# Patient Record
Sex: MALE | Race: WHITE | Employment: FULL TIME | URBAN - METROPOLITAN AREA
[De-identification: names, ages, dates, MRNs, and addresses within clinical notes are randomized per-mention and may not be internally consistent; named-entity substitution may affect disease eponyms.]

---

## 2022-12-24 ENCOUNTER — ANESTHESIA EVENT (OUTPATIENT)
Dept: EMERGENCY DEPT | Age: 47
End: 2022-12-24

## 2022-12-24 ENCOUNTER — ANESTHESIA (OUTPATIENT)
Dept: EMERGENCY DEPT | Age: 47
End: 2022-12-24
Payer: COMMERCIAL

## 2022-12-24 ENCOUNTER — HOSPITAL ENCOUNTER (EMERGENCY)
Age: 47
Discharge: HOME | End: 2022-12-24
Attending: EMERGENCY MEDICINE
Payer: COMMERCIAL

## 2022-12-24 ENCOUNTER — ANESTHESIA (OUTPATIENT)
Dept: EMERGENCY DEPT | Age: 47
End: 2022-12-24

## 2022-12-24 ENCOUNTER — ANESTHESIA EVENT (OUTPATIENT)
Dept: EMERGENCY DEPT | Age: 47
End: 2022-12-24
Payer: COMMERCIAL

## 2022-12-24 VITALS
WEIGHT: 215.6 LBS | SYSTOLIC BLOOD PRESSURE: 133 MMHG | OXYGEN SATURATION: 99 % | TEMPERATURE: 96.9 F | DIASTOLIC BLOOD PRESSURE: 80 MMHG | HEART RATE: 55 BPM | RESPIRATION RATE: 25 BRPM | BODY MASS INDEX: 24.94 KG/M2 | HEIGHT: 78 IN

## 2022-12-24 DIAGNOSIS — G97.1 POST LUMBAR PUNCTURE HEADACHE: Primary | ICD-10-CM

## 2022-12-24 LAB
A/G RATIO: 1.5 (ref 1.1–2.2)
ALBUMIN SERPL-MCNC: 4.2 G/DL (ref 3.4–5)
ALP BLD-CCNC: 75 U/L (ref 40–129)
ALT SERPL-CCNC: <5 U/L (ref 10–40)
ANION GAP SERPL CALCULATED.3IONS-SCNC: 5 MMOL/L (ref 3–16)
APTT: 27.4 SEC (ref 23–34.3)
AST SERPL-CCNC: 10 U/L (ref 15–37)
BASOPHILS ABSOLUTE: 0.1 K/UL (ref 0–0.2)
BASOPHILS RELATIVE PERCENT: 1 %
BILIRUB SERPL-MCNC: <0.2 MG/DL (ref 0–1)
BUN BLDV-MCNC: 10 MG/DL (ref 7–20)
CALCIUM SERPL-MCNC: 9.7 MG/DL (ref 8.3–10.6)
CHLORIDE BLD-SCNC: 98 MMOL/L (ref 99–110)
CO2: 33 MMOL/L (ref 21–32)
CREAT SERPL-MCNC: 0.8 MG/DL (ref 0.9–1.3)
EOSINOPHILS ABSOLUTE: 0.2 K/UL (ref 0–0.6)
EOSINOPHILS RELATIVE PERCENT: 3.7 %
GFR SERPL CREATININE-BSD FRML MDRD: >60 ML/MIN/{1.73_M2}
GLUCOSE BLD-MCNC: 107 MG/DL (ref 70–99)
HCT VFR BLD CALC: 42 % (ref 40.5–52.5)
HEMOGLOBIN: 13.9 G/DL (ref 13.5–17.5)
INR BLD: 1.02 (ref 0.87–1.14)
LYMPHOCYTES ABSOLUTE: 1.4 K/UL (ref 1–5.1)
LYMPHOCYTES RELATIVE PERCENT: 25.3 %
MCH RBC QN AUTO: 29.3 PG (ref 26–34)
MCHC RBC AUTO-ENTMCNC: 33.1 G/DL (ref 31–36)
MCV RBC AUTO: 88.5 FL (ref 80–100)
MONOCYTES ABSOLUTE: 0.4 K/UL (ref 0–1.3)
MONOCYTES RELATIVE PERCENT: 7.6 %
NEUTROPHILS ABSOLUTE: 3.3 K/UL (ref 1.7–7.7)
NEUTROPHILS RELATIVE PERCENT: 62.4 %
PDW BLD-RTO: 13.5 % (ref 12.4–15.4)
PLATELET # BLD: 184 K/UL (ref 135–450)
PMV BLD AUTO: 7.3 FL (ref 5–10.5)
POTASSIUM SERPL-SCNC: 4 MMOL/L (ref 3.5–5.1)
PROTHROMBIN TIME: 13.3 SEC (ref 11.7–14.5)
RBC # BLD: 4.75 M/UL (ref 4.2–5.9)
SODIUM BLD-SCNC: 136 MMOL/L (ref 136–145)
TOTAL PROTEIN: 7 G/DL (ref 6.4–8.2)
WBC # BLD: 5.3 K/UL (ref 4–11)

## 2022-12-24 PROCEDURE — 62273 INJECT EPIDURAL PATCH: CPT | Performed by: ANESTHESIOLOGY

## 2022-12-24 PROCEDURE — 85610 PROTHROMBIN TIME: CPT

## 2022-12-24 PROCEDURE — 99283 EMERGENCY DEPT VISIT LOW MDM: CPT

## 2022-12-24 PROCEDURE — 80053 COMPREHEN METABOLIC PANEL: CPT

## 2022-12-24 PROCEDURE — 85730 THROMBOPLASTIN TIME PARTIAL: CPT

## 2022-12-24 PROCEDURE — 85025 COMPLETE CBC W/AUTO DIFF WBC: CPT

## 2022-12-24 ASSESSMENT — PAIN DESCRIPTION - LOCATION: LOCATION: HEAD

## 2022-12-24 ASSESSMENT — PAIN DESCRIPTION - PAIN TYPE
TYPE: ACUTE PAIN
TYPE: ACUTE PAIN

## 2022-12-24 ASSESSMENT — PAIN SCALES - GENERAL
PAINLEVEL_OUTOF10: 8
PAINLEVEL_OUTOF10: 8
PAINLEVEL_OUTOF10: 3

## 2022-12-24 ASSESSMENT — PAIN DESCRIPTION - ONSET
ONSET: AWAKENED FROM SLEEP
ONSET: ON-GOING

## 2022-12-24 ASSESSMENT — PAIN - FUNCTIONAL ASSESSMENT
PAIN_FUNCTIONAL_ASSESSMENT: ACTIVITIES ARE NOT PREVENTED
PAIN_FUNCTIONAL_ASSESSMENT: ACTIVITIES ARE NOT PREVENTED
PAIN_FUNCTIONAL_ASSESSMENT: 0-10

## 2022-12-24 ASSESSMENT — PAIN DESCRIPTION - FREQUENCY
FREQUENCY: CONTINUOUS
FREQUENCY: CONTINUOUS

## 2022-12-24 ASSESSMENT — PAIN DESCRIPTION - DESCRIPTORS: DESCRIPTORS: ACHING

## 2022-12-24 NOTE — CONSULTS
Consult received from ED for EBP evaluation  Pt had dx LP 12/21 at another facility for Parkinson's workup  Now has classic PDPH sxs refractory to conservative therapy  Discussed R/B/O of EBP with pt who understands & wishes to proceed  Qs answered    Driss Riggs M.D.

## 2022-12-24 NOTE — CONSULTS
[facility] - called Dr Avendano Feeling at anesthesiology   Re: post LP headache.  needs blood patch  1327 - Dr Avendano Feeling on the line to speak with Dr Huy Rapp

## 2022-12-24 NOTE — PROGRESS NOTES
D: Patient requested to lay on is side. A: Lowered head of bed and patient turned self, lying on his left side.

## 2022-12-24 NOTE — PROGRESS NOTES
A: Reviewed discharge instructions with patient and spouse, both acknowledged understanding, patient dressed himself and was discharged to private car and with all belongings.

## 2022-12-24 NOTE — PROGRESS NOTES
A: Assisted Dr. Madisyn Berry with blood patch, applied tourniquet to right upper arm, 20 cc of blood draw from right AC INT, began LR at 999, patient tolerated well.

## 2022-12-24 NOTE — PROGRESS NOTES
A: Call placed to ER spoke to RN and rearranged for patient transport. D: Patient had spinal tap on 12/21/22 in [place] and presented to ER with C/O of headache. Spoke with Dr. Valerio Redding who ordered for the patient to receive a blood patch.

## 2022-12-24 NOTE — DISCHARGE INSTRUCTIONS
lood Patch Procedure: Care Instructions  Overview     A blood patch procedure uses your own blood to help your headache. Headaches may happen after certain procedures that involve the spine, such as a myelogram or an epidural for anesthesia. In these procedures, the needle that is used sometimes causes a bit of spinal fluid to leak out of the space around your spinal cord. The leak usually isn't dangerous. But if enough fluid leaks out, it changes the pressure around your spinal cord. The pressure around your spinal cord can also change if fluid is removed for testing during a spinal tap (lumbar puncture). This change in pressure can cause a very bad headache. To apply a blood patch, first your doctor takes blood from your arm. Then the blood is injected into the area of your lower back where the leak happened. The blood restores the pressure around your spinal cord. It also helps seal any leak that may still be there. Many people feel better right away, but it could take a day or two. And a few people need to have a second blood patch. Follow-up care is a key part of your treatment and safety. Be sure to make and go to all appointments, and call your doctor if you are having problems. It's also a good idea to know your test results and keep a list of the medicines you take. How can you care for yourself at home? For the first 24 hours, return to your regular activities slowly. Avoid strenuous activity. If the injection site is sore, put ice or a cold pack on the area for 10 to 20 minutes at a time. Put a thin cloth between the ice and your skin.

## 2022-12-24 NOTE — PROGRESS NOTES
D: Patient here from ER via stretcher, taken to bay 9 in PACU. A: Assessment completed and documented, call light is in reach, discussed plan of care with patient who agreed. Brought patients spouse back to be with patient, call light is in reach, spoke with CA about how to handle patient, Kendy HOFFMAN instructed to continue to chart on patient as an ER patient.

## 2022-12-24 NOTE — ED NOTES
Writer entered room to start lab work, patient refused stating \"If I do not get the blood patch I do not want the blood work done\" writer spoke with Dr. Anthony Bucio and went back to patient informing him of pending anesthesiology consult and need for possible pre op labs if blood patch is to be done and need for blood work, IV start for medical intervention. Patient refused again stating \"If they say no to the blood patch then I am leaving there is no need to do the bloodwork. \" Provider aware. Writer will continue to monitor.      Juventino Oleary RN  12/24/22 2472

## 2022-12-24 NOTE — ED PROVIDER NOTES
[address] [place] [address]  ED      CHIEF COMPLAINT  Headache (Pt to ED with c/o headache since Thursday this week. Pt reports he had a spinal tap done on Wednesday. )       HISTORY OF PRESENT ILLNESS  Tran Aguiar is a 49 y.o. male emergency department for evaluation of headache. Patient reports he has been getting worked up for possible Parkinson's at the [facility]. Reports having an LP done on Wednesday. Since then, he has been having 10 out of 10 headache. Says the headache is better when he lays down. Anytime he tries to sit up or do anything, the headache becomes a 10 out of 10 and unbearable. He has been trying all the over-the-counter headache medications and caffeine with no improvement of symptoms. Denies any recent illnesses. No fevers or chills. No weakness of arms or legs. Not on any blood thinners or aspirin. Denies having urinary or bowel incontinence. No other complaints, modifying factors or associated symptoms. I have reviewed the following from the nursing documentation. History reviewed. No pertinent past medical history. History reviewed. No pertinent surgical history. History reviewed. No pertinent family history.   Social History     Socioeconomic History    Marital status:      Spouse name: Not on file    Number of children: Not on file    Years of education: Not on file    Highest education level: Not on file   Occupational History    Not on file   Tobacco Use    Smoking status: Never    Smokeless tobacco: Never   Substance and Sexual Activity    Alcohol use: Yes     Comment: occ    Drug use: Not Currently    Sexual activity: Not on file   Other Topics Concern    Not on file   Social History Narrative    Not on file     Social Determinants of Health     Financial Resource Strain: Not on file   Food Insecurity: Not on file   Transportation Needs: Not on file   Physical Activity: Not on file   Stress: Not on file   Social Connections: Not on file   Intimate Partner Violence: Not on file   Housing Stability: Not on file     No current facility-administered medications for this encounter. No current outpatient medications on file. No Known Allergies    PMH, Surgical Hx, FH, Social Hx reviewed by myself. REVIEW OF SYSTEMS  10 systems reviewed, pertinent positives per HPI otherwise noted to be negative. PHYSICAL EXAM  BP (!) 123/93   Pulse 53   Temp 96.8 °F (36 °C) (Temporal)   Resp 20   Ht 6' 8\" (2.032 m)   Wt 215 lb 9.6 oz (97.8 kg)   SpO2 99%   BMI 23.68 kg/m²    GENERAL APPEARANCE: Awake and alert. No acute distress. HENT: Normocephalic. Atraumatic. EOMI. No facial droop. Symmetric smile   LUNGS: Respirations unlabored. Speaking comfortably in full sentences. NECK/BACK: no external visible hematoma. ABDOMEN: Soft, non-distended abdomen. Non tender to palpation. No guarding. No rebound. EXTREMITIES: No gross deformities. Moving all extremities with 5/5 strength. No leg drift. No pronator drift. Sensation intact to all extremities. SKIN: Warm and dry. No acute rashes. NEUROLOGICAL: Alert and oriented. No gross facial drooping. Answering questions appropriately. Moving all extremities. PSYCHIATRIC: Pleasant. Normal mood and affect.     LABS  Results for orders placed or performed during the hospital encounter of 12/24/22   Comprehensive Metabolic Panel   Result Value Ref Range    Sodium 136 136 - 145 mmol/L    Potassium 4.0 3.5 - 5.1 mmol/L    Chloride 98 (L) 99 - 110 mmol/L    CO2 33 (H) 21 - 32 mmol/L    Anion Gap 5 3 - 16    Glucose 107 (H) 70 - 99 mg/dL    BUN 10 7 - 20 mg/dL    Creatinine 0.8 (L) 0.9 - 1.3 mg/dL    Est, Glom Filt Rate >60 >60    Calcium 9.7 8.3 - 10.6 mg/dL    Total Protein 7.0 6.4 - 8.2 g/dL    Albumin 4.2 3.4 - 5.0 g/dL    Albumin/Globulin Ratio 1.5 1.1 - 2.2    Total Bilirubin <0.2 0.0 - 1.0 mg/dL    Alkaline Phosphatase 75 40 - 129 U/L    ALT <5 (L) 10 - 40 U/L    AST 10 (L) 15 - 37 U/L   CBC with Auto Differential   Result Value Ref Range    WBC 5.3 4.0 - 11.0 K/uL    RBC 4.75 4.20 - 5.90 M/uL    Hemoglobin 13.9 13.5 - 17.5 g/dL    Hematocrit 42.0 40.5 - 52.5 %    MCV 88.5 80.0 - 100.0 fL    MCH 29.3 26.0 - 34.0 pg    MCHC 33.1 31.0 - 36.0 g/dL    RDW 13.5 12.4 - 15.4 %    Platelets 164 669 - 678 K/uL    MPV 7.3 5.0 - 10.5 fL    Neutrophils % 62.4 %    Lymphocytes % 25.3 %    Monocytes % 7.6 %    Eosinophils % 3.7 %    Basophils % 1.0 %    Neutrophils Absolute 3.3 1.7 - 7.7 K/uL    Lymphocytes Absolute 1.4 1.0 - 5.1 K/uL    Monocytes Absolute 0.4 0.0 - 1.3 K/uL    Eosinophils Absolute 0.2 0.0 - 0.6 K/uL    Basophils Absolute 0.1 0.0 - 0.2 K/uL   Protime-INR   Result Value Ref Range    Protime 13.3 11.7 - 14.5 sec    INR 1.02 0.87 - 1.14   APTT   Result Value Ref Range    aPTT 27.4 23.0 - 34.3 sec       I have reviewed all labs for this visit. RADIOLOGY  No results found. ED COURSE/MDM  Patient seen and evaluated. At presentation, patient was calm alert, afebrile, hemodynamically stable, and satting well on room air. On exam, had symmetric smile, no facial droop, no pronator drift, no leg drift, intact sensation to all extremities. Denies having urinary or bowel incontinence. Patient describes having a classic post LP headache. Has tried over-the-counter medications and caffeine. Anesthesiology consulted. I spoke with Dr. Salvatore Tapia. Plan for blood patch. Pre op labs obtained. Creatinine is normal.  PTT normal.  Hemoglobin normal at 13.9. Patient sent to the OR for blood patch. Is this patient to be included in the SEP-1 Core Measure due to severe sepsis or septic shock? No   Exclusion criteria - the patient is NOT to be included for SEP-1 Core Measure due to:  2+ SIRS criteria are not met     Pt was seen during the COVID 19 pandemic. Appropriate PPE worn by ME during patient encounters. Patient was cared for during a time with constrained hospital bed capacity with nationwide stress on resources and staffing. During the patient's ED course, the patient was given:  Medications - No data to display     CLINICAL IMPRESSION  1. Post lumbar puncture headache        Blood pressure (!) 123/93, pulse 53, temperature 96.8 °F (36 °C), temperature source Temporal, resp. rate 20, height 6' 8\" (2.032 m), weight 215 lb 9.6 oz (97.8 kg), SpO2 99 %. DISPOSITION  Anatoliy Villasenor - to the OR. Patient was given scripts for the following medications. I counseled patient how to take these medications. New Prescriptions    No medications on file       Follow-up with:  No follow-up provider specified. DISCLAIMER: This chart was created using Dragon dictation software. Efforts were made by me to ensure accuracy, however some errors may be present due to limitations of this technology and occasionally words are not transcribed correctly.        Ana Santana MD  12/24/22 0294

## 2022-12-24 NOTE — ANESTHESIA PROCEDURE NOTES
Epidural Blood Patch    Patient location during procedure: procedural area  Start time: 12/24/2022 3:44 PM  End time: 12/24/2022 3:53 PM  Staffing  Performed: anesthesiologist   Anesthesiologist: Malika Villafana MD  Epidural  Patient position: sitting  Prep: Betadine  Approach: midline  Injection technique: CARISSA saline  Provider prep: mask and sterile gloves  Needle  Needle type: Tuohy   Needle gauge: 17 G  Needle length: 3.5 in  Needle insertion depth: 6.4 cm  Assessment  Attempts: 1  Additional Notes  Significant sx improvement  Given 1 liter IV LR following EBP  D/C home after IVF  Preanesthetic Checklist  Completed: patient identified, IV checked, site marked, risks and benefits discussed, surgical/procedural consents, equipment checked, pre-op evaluation, timeout performed, anesthesia consent given, oxygen available, monitors applied/VS acknowledged, fire risk safety assessment completed and verbalized and blood product R/B/A discussed and consented